# Patient Record
Sex: FEMALE | Employment: OTHER
[De-identification: names, ages, dates, MRNs, and addresses within clinical notes are randomized per-mention and may not be internally consistent; named-entity substitution may affect disease eponyms.]

---

## 2023-02-28 ENCOUNTER — HOSPITAL ENCOUNTER (OUTPATIENT)
Facility: HOSPITAL | Age: 68
Setting detail: SPECIMEN
Discharge: HOME | End: 2023-03-03
Payer: MEDICARE

## 2023-02-28 ENCOUNTER — OFFICE VISIT (OUTPATIENT)
Facility: CLINIC | Age: 68
End: 2023-02-28
Payer: MEDICARE

## 2023-02-28 VITALS
RESPIRATION RATE: 16 BRPM | OXYGEN SATURATION: 96 % | SYSTOLIC BLOOD PRESSURE: 136 MMHG | WEIGHT: 197.5 LBS | TEMPERATURE: 98.3 F | BODY MASS INDEX: 33.72 KG/M2 | HEART RATE: 98 BPM | DIASTOLIC BLOOD PRESSURE: 78 MMHG | HEIGHT: 64 IN

## 2023-02-28 DIAGNOSIS — F33.1 MODERATE EPISODE OF RECURRENT MAJOR DEPRESSIVE DISORDER (HCC): ICD-10-CM

## 2023-02-28 DIAGNOSIS — E66.09 CLASS 1 OBESITY DUE TO EXCESS CALORIES WITH SERIOUS COMORBIDITY AND BODY MASS INDEX (BMI) OF 33.0 TO 33.9 IN ADULT: ICD-10-CM

## 2023-02-28 DIAGNOSIS — I10 ESSENTIAL (PRIMARY) HYPERTENSION: ICD-10-CM

## 2023-02-28 DIAGNOSIS — E11.9 TYPE 2 DIABETES MELLITUS WITHOUT COMPLICATION, WITHOUT LONG-TERM CURRENT USE OF INSULIN (HCC): Primary | ICD-10-CM

## 2023-02-28 DIAGNOSIS — E78.2 MIXED HYPERLIPIDEMIA: ICD-10-CM

## 2023-02-28 DIAGNOSIS — J30.1 SEASONAL ALLERGIC RHINITIS DUE TO POLLEN: ICD-10-CM

## 2023-02-28 PROBLEM — K63.5 POLYP OF COLON: Status: ACTIVE | Noted: 2018-04-19

## 2023-02-28 PROBLEM — K29.70 GASTRITIS DUE TO HELICOBACTER SPECIES: Status: ACTIVE | Noted: 2023-02-28

## 2023-02-28 PROBLEM — N92.0 POLYMENORRHEA: Status: ACTIVE | Noted: 2023-02-28

## 2023-02-28 PROBLEM — M19.91 PRIMARY LOCALIZED OSTEOARTHRITIS: Status: ACTIVE | Noted: 2023-02-28

## 2023-02-28 PROBLEM — G62.9 PERIPHERAL NEUROPATHY: Status: ACTIVE | Noted: 2023-02-28

## 2023-02-28 PROBLEM — Z90.710 HISTORY OF TOTAL HYSTERECTOMY: Status: ACTIVE | Noted: 2023-02-28

## 2023-02-28 PROBLEM — R25.1 TREMORS OF NERVOUS SYSTEM: Status: ACTIVE | Noted: 2022-12-07

## 2023-02-28 PROBLEM — I83.93 VARICOSE VEINS OF BOTH LOWER EXTREMITIES: Status: ACTIVE | Noted: 2023-02-28

## 2023-02-28 PROBLEM — L71.9 ROSACEA, UNSPECIFIED: Status: ACTIVE | Noted: 2018-07-09

## 2023-02-28 PROBLEM — H40.013 OPEN ANGLE WITH BORDERLINE FINDINGS, LOW RISK, BILATERAL: Status: ACTIVE | Noted: 2022-04-29

## 2023-02-28 PROBLEM — G43.909 MIGRAINE HEADACHE: Status: ACTIVE | Noted: 2023-02-28

## 2023-02-28 PROBLEM — B96.81 GASTRITIS DUE TO HELICOBACTER SPECIES: Status: ACTIVE | Noted: 2023-02-28

## 2023-02-28 PROBLEM — H92.09 OTALGIA: Status: ACTIVE | Noted: 2023-02-28

## 2023-02-28 PROBLEM — R07.9 CHEST PAIN: Status: ACTIVE | Noted: 2023-02-28

## 2023-02-28 PROBLEM — N81.6 HERNIATION OF RECTUM INTO VAGINA: Status: ACTIVE | Noted: 2023-02-28

## 2023-02-28 PROBLEM — J30.9 ALLERGIC RHINITIS: Status: ACTIVE | Noted: 2023-02-28

## 2023-02-28 PROBLEM — R03.0 ELEVATED BLOOD-PRESSURE READING, WITHOUT DIAGNOSIS OF HYPERTENSION: Status: ACTIVE | Noted: 2023-02-28

## 2023-02-28 PROBLEM — F32.A DEPRESSION: Status: ACTIVE | Noted: 2023-02-28

## 2023-02-28 PROBLEM — F41.1 GENERALIZED ANXIETY DISORDER: Status: ACTIVE | Noted: 2023-02-28

## 2023-02-28 PROBLEM — I87.2 PERIPHERAL VENOUS INSUFFICIENCY: Status: ACTIVE | Noted: 2023-02-28

## 2023-02-28 PROBLEM — E66.3 OVERWEIGHT: Status: ACTIVE | Noted: 2023-02-28

## 2023-02-28 PROBLEM — M71.9 BURSITIS: Status: ACTIVE | Noted: 2023-02-28

## 2023-02-28 PROBLEM — G56.01 RIGHT CARPAL TUNNEL SYNDROME: Status: ACTIVE | Noted: 2022-12-07

## 2023-02-28 PROBLEM — D64.9 ANEMIA: Status: ACTIVE | Noted: 2023-02-28

## 2023-02-28 PROBLEM — R12 HEARTBURN: Status: ACTIVE | Noted: 2023-02-28

## 2023-02-28 PROBLEM — M79.606 PAIN OF LOWER EXTREMITY: Status: ACTIVE | Noted: 2023-02-28

## 2023-02-28 PROBLEM — H52.4 PRESBYOPIA: Status: ACTIVE | Noted: 2023-02-28

## 2023-02-28 PROBLEM — G45.9 TRANSIENT ISCHEMIC ATTACK (TIA): Status: ACTIVE | Noted: 2023-02-28

## 2023-02-28 PROBLEM — G47.33 OBSTRUCTIVE SLEEP APNEA SYNDROME: Status: ACTIVE | Noted: 2023-02-28

## 2023-02-28 PROBLEM — H69.80 EUSTACHIAN TUBE DYSFUNCTION: Status: ACTIVE | Noted: 2023-02-28

## 2023-02-28 PROBLEM — K64.8 HEMORRHOIDS WITH COMPLICATION: Status: ACTIVE | Noted: 2023-02-28

## 2023-02-28 PROBLEM — E11.40 TYPE 2 DIABETES MELLITUS WITH DIABETIC NEUROPATHY (HCC): Status: ACTIVE | Noted: 2022-08-29

## 2023-02-28 PROBLEM — E55.9 VITAMIN D DEFICIENCY: Status: ACTIVE | Noted: 2022-12-07

## 2023-02-28 PROBLEM — M23.305 DERANGEMENT OF MEDIAL MENISCUS: Status: ACTIVE | Noted: 2023-02-28

## 2023-02-28 PROBLEM — H52.31 ANISOMETROPIA: Status: ACTIVE | Noted: 2023-02-28

## 2023-02-28 PROBLEM — H69.90 EUSTACHIAN TUBE DYSFUNCTION: Status: ACTIVE | Noted: 2023-02-28

## 2023-02-28 PROBLEM — N95.8 POSTARTIFICIAL MENOPAUSAL SYNDROME: Status: ACTIVE | Noted: 2023-02-28

## 2023-02-28 PROBLEM — H11.32 SUBCONJUNCTIVAL HEMORRHAGE OF LEFT EYE: Status: ACTIVE | Noted: 2023-02-28

## 2023-02-28 PROBLEM — M25.569 ARTHRALGIA OF KNEE: Status: ACTIVE | Noted: 2023-02-28

## 2023-02-28 PROBLEM — E66.9 OBESITY: Status: ACTIVE | Noted: 2023-02-28

## 2023-02-28 PROBLEM — N92.1 MENOMETRORRHAGIA: Status: ACTIVE | Noted: 2023-02-28

## 2023-02-28 PROBLEM — R73.03 PREDIABETES: Status: ACTIVE | Noted: 2018-03-13

## 2023-02-28 PROBLEM — N85.01 SIMPLE ENDOMETRIAL HYPERPLASIA: Status: ACTIVE | Noted: 2023-02-28

## 2023-02-28 PROBLEM — M17.9 OSTEOARTHRITIS OF KNEE: Status: ACTIVE | Noted: 2018-07-23

## 2023-02-28 PROBLEM — E78.49 FAMILIAL COMBINED HYPERLIPIDEMIA: Status: ACTIVE | Noted: 2023-02-28

## 2023-02-28 PROBLEM — F90.9 ATTENTION-DEFICIT HYPERACTIVITY DISORDER: Status: ACTIVE | Noted: 2023-02-28

## 2023-02-28 PROBLEM — H25.13 NUCLEAR SCLEROTIC CATARACT OF BOTH EYES: Status: ACTIVE | Noted: 2022-04-29

## 2023-02-28 PROBLEM — E78.5 HYPERLIPIDEMIA: Status: ACTIVE | Noted: 2023-02-28

## 2023-02-28 PROBLEM — R53.83 OTHER FATIGUE: Status: ACTIVE | Noted: 2018-03-13

## 2023-02-28 LAB
ALBUMIN SERPL-MCNC: 4.2 G/DL (ref 3.4–5)
ALBUMIN/GLOB SERPL: 1.4 (ref 0.8–1.7)
ALP SERPL-CCNC: 76 U/L (ref 45–117)
ALT SERPL-CCNC: 56 U/L (ref 13–56)
ANION GAP SERPL CALC-SCNC: 5 MMOL/L (ref 3–18)
AST SERPL-CCNC: 37 U/L (ref 10–38)
BILIRUB SERPL-MCNC: 0.6 MG/DL (ref 0.2–1)
BUN SERPL-MCNC: 12 MG/DL (ref 7–18)
BUN/CREAT SERPL: 14 (ref 12–20)
CALCIUM SERPL-MCNC: 9.7 MG/DL (ref 8.5–10.1)
CHLORIDE SERPL-SCNC: 107 MMOL/L (ref 100–111)
CHOLEST SERPL-MCNC: 190 MG/DL
CO2 SERPL-SCNC: 28 MMOL/L (ref 21–32)
CREAT SERPL-MCNC: 0.86 MG/DL (ref 0.6–1.3)
CREAT UR-MCNC: 162 MG/DL (ref 30–125)
ERYTHROCYTE [DISTWIDTH] IN BLOOD BY AUTOMATED COUNT: 13.8 % (ref 11.6–14.5)
EST. AVERAGE GLUCOSE BLD GHB EST-MCNC: 134 MG/DL
GLOBULIN SER CALC-MCNC: 3.1 G/DL (ref 2–4)
GLUCOSE SERPL-MCNC: 118 MG/DL (ref 74–99)
HBA1C MFR BLD: 6.3 % (ref 4.2–5.6)
HCT VFR BLD AUTO: 41 % (ref 35–45)
HDLC SERPL-MCNC: 50 MG/DL (ref 40–60)
HDLC SERPL: 3.8 (ref 0–5)
HGB BLD-MCNC: 12.9 G/DL (ref 12–16)
LDLC SERPL CALC-MCNC: 96.2 MG/DL (ref 0–100)
LIPID PANEL: ABNORMAL
MCH RBC QN AUTO: 28.5 PG (ref 24–34)
MCHC RBC AUTO-ENTMCNC: 31.5 G/DL (ref 31–37)
MCV RBC AUTO: 90.5 FL (ref 78–100)
MICROALBUMIN UR-MCNC: 2.14 MG/DL (ref 0–3)
MICROALBUMIN/CREAT UR-RTO: 13 MG/G (ref 0–30)
NRBC # BLD: 0 K/UL (ref 0–0.01)
NRBC BLD-RTO: 0 PER 100 WBC
PLATELET # BLD AUTO: 274 K/UL (ref 135–420)
PMV BLD AUTO: 10.5 FL (ref 9.2–11.8)
POTASSIUM SERPL-SCNC: 4.8 MMOL/L (ref 3.5–5.5)
PROT SERPL-MCNC: 7.3 G/DL (ref 6.4–8.2)
RBC # BLD AUTO: 4.53 M/UL (ref 4.2–5.3)
SODIUM SERPL-SCNC: 140 MMOL/L (ref 136–145)
TRIGL SERPL-MCNC: 219 MG/DL
VLDLC SERPL CALC-MCNC: 43.8 MG/DL
WBC # BLD AUTO: 7.4 K/UL (ref 4.6–13.2)

## 2023-02-28 PROCEDURE — 80061 LIPID PANEL: CPT

## 2023-02-28 PROCEDURE — G8400 PT W/DXA NO RESULTS DOC: HCPCS | Performed by: STUDENT IN AN ORGANIZED HEALTH CARE EDUCATION/TRAINING PROGRAM

## 2023-02-28 PROCEDURE — 83036 HEMOGLOBIN GLYCOSYLATED A1C: CPT

## 2023-02-28 PROCEDURE — 1090F PRES/ABSN URINE INCON ASSESS: CPT | Performed by: STUDENT IN AN ORGANIZED HEALTH CARE EDUCATION/TRAINING PROGRAM

## 2023-02-28 PROCEDURE — 1123F ACP DISCUSS/DSCN MKR DOCD: CPT | Performed by: STUDENT IN AN ORGANIZED HEALTH CARE EDUCATION/TRAINING PROGRAM

## 2023-02-28 PROCEDURE — 3017F COLORECTAL CA SCREEN DOC REV: CPT | Performed by: STUDENT IN AN ORGANIZED HEALTH CARE EDUCATION/TRAINING PROGRAM

## 2023-02-28 PROCEDURE — G8417 CALC BMI ABV UP PARAM F/U: HCPCS | Performed by: STUDENT IN AN ORGANIZED HEALTH CARE EDUCATION/TRAINING PROGRAM

## 2023-02-28 PROCEDURE — 3078F DIAST BP <80 MM HG: CPT | Performed by: STUDENT IN AN ORGANIZED HEALTH CARE EDUCATION/TRAINING PROGRAM

## 2023-02-28 PROCEDURE — 82043 UR ALBUMIN QUANTITATIVE: CPT

## 2023-02-28 PROCEDURE — 3046F HEMOGLOBIN A1C LEVEL >9.0%: CPT | Performed by: STUDENT IN AN ORGANIZED HEALTH CARE EDUCATION/TRAINING PROGRAM

## 2023-02-28 PROCEDURE — 80053 COMPREHEN METABOLIC PANEL: CPT

## 2023-02-28 PROCEDURE — 3075F SYST BP GE 130 - 139MM HG: CPT | Performed by: STUDENT IN AN ORGANIZED HEALTH CARE EDUCATION/TRAINING PROGRAM

## 2023-02-28 PROCEDURE — 2022F DILAT RTA XM EVC RTNOPTHY: CPT | Performed by: STUDENT IN AN ORGANIZED HEALTH CARE EDUCATION/TRAINING PROGRAM

## 2023-02-28 PROCEDURE — 85027 COMPLETE CBC AUTOMATED: CPT

## 2023-02-28 PROCEDURE — G8484 FLU IMMUNIZE NO ADMIN: HCPCS | Performed by: STUDENT IN AN ORGANIZED HEALTH CARE EDUCATION/TRAINING PROGRAM

## 2023-02-28 PROCEDURE — 1036F TOBACCO NON-USER: CPT | Performed by: STUDENT IN AN ORGANIZED HEALTH CARE EDUCATION/TRAINING PROGRAM

## 2023-02-28 PROCEDURE — 99204 OFFICE O/P NEW MOD 45 MIN: CPT | Performed by: STUDENT IN AN ORGANIZED HEALTH CARE EDUCATION/TRAINING PROGRAM

## 2023-02-28 PROCEDURE — G8427 DOCREV CUR MEDS BY ELIG CLIN: HCPCS | Performed by: STUDENT IN AN ORGANIZED HEALTH CARE EDUCATION/TRAINING PROGRAM

## 2023-02-28 RX ORDER — NALOXONE HYDROCHLORIDE 4 MG/.1ML
SPRAY NASAL
COMMUNITY
Start: 2021-09-22

## 2023-02-28 RX ORDER — PANTOPRAZOLE SODIUM 20 MG/1
TABLET, DELAYED RELEASE ORAL
COMMUNITY
Start: 2021-09-12

## 2023-02-28 RX ORDER — ROSUVASTATIN CALCIUM 10 MG/1
1 TABLET, COATED ORAL DAILY
COMMUNITY
Start: 2021-12-20 | End: 2023-02-28 | Stop reason: SDUPTHER

## 2023-02-28 RX ORDER — MONTELUKAST SODIUM 10 MG/1
10 TABLET ORAL
COMMUNITY
Start: 2021-05-28 | End: 2023-02-28 | Stop reason: SINTOL

## 2023-02-28 RX ORDER — ROSUVASTATIN CALCIUM 10 MG/1
10 TABLET, COATED ORAL DAILY
Qty: 90 TABLET | Refills: 1 | Status: SHIPPED | OUTPATIENT
Start: 2023-02-28

## 2023-02-28 RX ORDER — SENNA PLUS 8.6 MG/1
8.6 TABLET ORAL
COMMUNITY
Start: 2022-04-18 | End: 2023-04-18

## 2023-02-28 RX ORDER — ASPIRIN 81 MG/1
TABLET ORAL
COMMUNITY
Start: 2021-09-12 | End: 2023-02-28

## 2023-02-28 RX ORDER — LISINOPRIL 20 MG/1
20 TABLET ORAL DAILY
Qty: 90 TABLET | Refills: 1 | Status: SHIPPED | OUTPATIENT
Start: 2023-02-28

## 2023-02-28 RX ORDER — DOCUSATE CALCIUM 240 MG
CAPSULE ORAL
COMMUNITY
Start: 2021-09-12

## 2023-02-28 RX ORDER — LISDEXAMFETAMINE DIMESYLATE 60 MG/1
CAPSULE ORAL
COMMUNITY
Start: 2023-02-09

## 2023-02-28 RX ORDER — AMLODIPINE BESYLATE 10 MG/1
1 TABLET ORAL DAILY
COMMUNITY
Start: 2022-02-01 | End: 2023-02-28 | Stop reason: ALTCHOICE

## 2023-02-28 RX ORDER — ALPRAZOLAM 0.5 MG/1
TABLET ORAL
COMMUNITY
Start: 2021-08-31

## 2023-02-28 RX ORDER — BUPROPION HYDROCHLORIDE 300 MG/1
TABLET ORAL
COMMUNITY
Start: 2023-02-12

## 2023-02-28 SDOH — ECONOMIC STABILITY: FOOD INSECURITY: WITHIN THE PAST 12 MONTHS, YOU WORRIED THAT YOUR FOOD WOULD RUN OUT BEFORE YOU GOT MONEY TO BUY MORE.: NEVER TRUE

## 2023-02-28 SDOH — ECONOMIC STABILITY: FOOD INSECURITY: WITHIN THE PAST 12 MONTHS, THE FOOD YOU BOUGHT JUST DIDN'T LAST AND YOU DIDN'T HAVE MONEY TO GET MORE.: NEVER TRUE

## 2023-02-28 SDOH — ECONOMIC STABILITY: INCOME INSECURITY: HOW HARD IS IT FOR YOU TO PAY FOR THE VERY BASICS LIKE FOOD, HOUSING, MEDICAL CARE, AND HEATING?: NOT HARD AT ALL

## 2023-02-28 SDOH — ECONOMIC STABILITY: HOUSING INSECURITY
IN THE LAST 12 MONTHS, WAS THERE A TIME WHEN YOU DID NOT HAVE A STEADY PLACE TO SLEEP OR SLEPT IN A SHELTER (INCLUDING NOW)?: NO

## 2023-02-28 ASSESSMENT — PATIENT HEALTH QUESTIONNAIRE - PHQ9
3. TROUBLE FALLING OR STAYING ASLEEP: 2
SUM OF ALL RESPONSES TO PHQ QUESTIONS 1-9: 13
2. FEELING DOWN, DEPRESSED OR HOPELESS: 2
10. IF YOU CHECKED OFF ANY PROBLEMS, HOW DIFFICULT HAVE THESE PROBLEMS MADE IT FOR YOU TO DO YOUR WORK, TAKE CARE OF THINGS AT HOME, OR GET ALONG WITH OTHER PEOPLE: 1
8. MOVING OR SPEAKING SO SLOWLY THAT OTHER PEOPLE COULD HAVE NOTICED. OR THE OPPOSITE, BEING SO FIGETY OR RESTLESS THAT YOU HAVE BEEN MOVING AROUND A LOT MORE THAN USUAL: 1
SUM OF ALL RESPONSES TO PHQ9 QUESTIONS 1 & 2: 4
7. TROUBLE CONCENTRATING ON THINGS, SUCH AS READING THE NEWSPAPER OR WATCHING TELEVISION: 1
9. THOUGHTS THAT YOU WOULD BE BETTER OFF DEAD, OR OF HURTING YOURSELF: 0
SUM OF ALL RESPONSES TO PHQ QUESTIONS 1-9: 13
5. POOR APPETITE OR OVEREATING: 2
SUM OF ALL RESPONSES TO PHQ QUESTIONS 1-9: 13
1. LITTLE INTEREST OR PLEASURE IN DOING THINGS: 2
SUM OF ALL RESPONSES TO PHQ QUESTIONS 1-9: 13
4. FEELING TIRED OR HAVING LITTLE ENERGY: 2
6. FEELING BAD ABOUT YOURSELF - OR THAT YOU ARE A FAILURE OR HAVE LET YOURSELF OR YOUR FAMILY DOWN: 1

## 2023-02-28 NOTE — PROGRESS NOTES
Chief Complaint   Patient presents with    Establish Care     Blood sugar     1. \"Have you been to the ER, urgent care clinic since your last visit? Hospitalized since your last visit? \" No    2. \"Have you seen or consulted any other health care providers outside of the [address] [place] [address] since your last visit? \" No     3. For patients aged 39-70: Has the patient had a colonoscopy / FIT/ Cologuard? Yes - no Care Gap present      If the patient is female:    4. For patients aged 41-77: Has the patient had a mammogram within the past 2 years? Yes - no Care Gap present      5. For patients aged 21-65: Has the patient had a pap smear?  NA - based on age or sex

## 2023-02-28 NOTE — PROGRESS NOTES
Junior Larios (: 1955) is a 74 y.o. female, new patient, here for evaluation of the following chief complaint(s):  Establish Care (Blood sugar)       Assessment/Plan:  1. Type 2 diabetes mellitus without complication, without long-term current use of insulin (HCC)-discussed diabetic diet and increasing exercise. Recheck A1c today. -     Hemoglobin A1C; Future  -     Microalbumin / Creatinine Urine Ratio; Future  2. Moderate episode of recurrent major depressive disorder (HCC)-currently seeing a PA for medication management. Strongly recommend finding a therapist as many of her comorbid illnesses I believe are related to her persistent depression. Including diet changes and lack of exercise. 3. Class 1 obesity due to excess calories with serious comorbidity and body mass index (BMI) of 33.0 to 33.9 in adult-diet and lifestyle modifications for weight loss. Dietary principles from MyPlate.gov for a healthy, varied diet. Reduce caloric intake. Recommend cumulatively 150-minutes of moderate intensity exercise or 75-minutes of vigorous activity weekly. 4. Mixed hyperlipidemia continue Crestor for the time being. Recheck lipid panel. Recommend STOP aspirin as this was for primary prevention and trying to reduce polypharmacy. -     Lipid Panel; Future  -     rosuvastatin (CRESTOR) 10 MG tablet; Take 1 tablet by mouth daily, Disp-90 tablet, R-1Normal  5. Essential (primary) hypertension-previously on amlodipine 10 mg.  Given new diagnosis of diabetes 6 months ago, will STOP amlodipine, switch to an ACE inhibitor  -     Comprehensive Metabolic Panel; Future  -     CBC; Future  -     lisinopril (PRINIVIL;ZESTRIL) 20 MG tablet; Take 1 tablet by mouth daily, Disp-90 tablet, R-1Normal  6. Seasonal allergic rhinitis due to pollen-Claritin as needed as well as albuterol. Recommend STOP Singulair given its black box warning of neuropsych events in the patient's underlying psychiatric illness.     Return in about 4 weeks (around 3/28/2023) for [address] 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 Daughter is a fan medicine doctor in the [employer] currently stationed in [facility]. Unfortunately patient had significant family trauma of her son taking his own life 5 years ago. Depression-currently seeing a telehealth PA for her psychiatric medications. Not currently with therapy. Discussed some decreased motivation and interest in doing things she used to. Waiting on some self-medication including sugar and as needed cigarettes. Diabetes-upcoming eye appointment next month. Slowly been creeping up A1c over the last 10 years. Last A1c 6 months ago was 6.5. Never on any antidiabetic medications. Prefers to keep it this way. Had good weight loss and felt better went on a vegan diet and is going to try this again. Allergies-has been on Singulair and Claritin. Vitals  Blood pressure 136/78, pulse 98, temperature 98.3 °F (36.8 °C), temperature source Tympanic, resp. rate 16, height 5' 4\" (1.626 m), weight 197 lb 8 oz (89.6 kg), SpO2 96 %. Body mass index is 33.9 kg/m². General appearance - Alert, NAD. Head - Atraumatic. Normocephalic. No lymphadenopathy  Eyes - EOMI. Sclera white. Ears - Hearing grossly normal.    Nose - Nares patent, no polyps  Throat - pharynx clear, no exudates. Respiratory - LCTAB. No wheeze/rale/rhonchi  Heart - Normal rate, regular rhythm. No m/r/r  Abdomen - Soft, non tender. Non distended. Neurological - No focal deficits. Speech normal.   Musculoskeletal - Normal ROM, Gait normal.    Extremities - No LE edema. Skin - normal coloration and normal turgor. No cyanosis, no rash. Medical History- Reviewed Social History- Reviewed Surgical History- Reviewed   Ranjit Jarrett has no past medical history on file. Ranjit Jarrett reports that she quit smoking about 34 years ago. Her smoking use included cigarettes.  She has a 7.50 pack-year smoking history. She has never used smokeless tobacco. She reports that she does not drink alcohol and does not use drugs. Elisa Cabezas has a past surgical history that includes knee surgery (Bilateral) and Hysterectomy, total abdominal.         Problem List- Reviewed   Elisa Cabezas has Allergic rhinitis; Anemia; Chest pain; Attention-deficit hyperactivity disorder; Bursitis; Osteoarthritis of knee; Depression; Derangement of medial meniscus; Elevated blood-pressure reading, without diagnosis of hypertension; Essential (primary) hypertension; Eustachian tube dysfunction; Familial combined hyperlipidemia; Gastritis due to Helicobacter species; Generalized anxiety disorder; Heartburn; Hemorrhoids with complication; Herniation of rectum into vagina; History of total hysterectomy; Hyperlipidemia; Anisometropia; Menometrorrhagia; Migraine headache; Nuclear sclerotic cataract of both eyes; Obesity; Obstructive sleep apnea syndrome; Open angle with borderline findings, low risk, bilateral; Otalgia; Other fatigue; Overweight; Pain of lower extremity; Peripheral neuropathy; Peripheral venous insufficiency; Polymenorrhea; Polyp of colon; Postartificial menopausal syndrome; Prediabetes; Presbyopia; Arthralgia of knee; Primary localized osteoarthritis; Right carpal tunnel syndrome; Rosacea, unspecified; Simple endometrial hyperplasia; Subconjunctival hemorrhage of left eye; Transient ischemic attack (TIA); Tremors of nervous system; Type 2 diabetes mellitus with diabetic neuropathy ([address] Type 2 diabetes mellitus without complications ([address] Varicose veins of both lower extremities; and Vitamin D deficiency on their problem list.       Current Outpatient Medications   Medication Instructions    ALPRAZolam (XANAX) 0.5 MG tablet No dose, route, or frequency recorded.     buPROPion (WELLBUTRIN XL) 300 MG extended release tablet take 1 tablet by mouth every morning    docusate calcium (SURFAK) 240 MG capsule docusate calcium 240 mg oral capsule  Start Date: 9/12/21  Status: Ordered    lisinopril (PRINIVIL;ZESTRIL) 20 mg, Oral, DAILY    naloxone 4 MG/0.1ML LIQD nasal spray naloxone 4 mg/0.1 mL nasal spray  Start Date: 9/22/21  Status: Ordered    pantoprazole (PROTONIX) 20 MG tablet pantoprazole 20 mg oral delayed release tablet  Start Date: 9/12/21  Status: Ordered    rosuvastatin (CRESTOR) 10 mg, Oral, DAILY    senna (SENOKOT) 8.6 mg, Oral    vitamin D (CHOLECALCIFEROL) 2,000 Units, Oral, DAILY    VORTIoxetine HBr (TRINTELLIX) 20 MG TABS tablet No dose, route, or frequency recorded. VYVANSE 60 MG CAPS take 1 capsule by mouth every morning           An electronic signature was used to authenticate this note.   -- Jesús Felder MD

## 2023-03-24 ENCOUNTER — TELEPHONE (OUTPATIENT)
Facility: CLINIC | Age: 68
End: 2023-03-24

## 2023-03-24 NOTE — TELEPHONE ENCOUNTER
----- Message from Merrill Snowden MD sent at 3/22/2023 11:39 AM EDT -----  Overall your blood work looks great!  -A1c of 6.3. Continue diabetic diet as we discussed. No new medications needed at this time. -Mild elevation of triglycerides.  -Your electrolytes, kidney function, liver function, blood cell counts, and cholesterol were all within good ranges.

## 2023-03-29 ENCOUNTER — OFFICE VISIT (OUTPATIENT)
Facility: CLINIC | Age: 68
End: 2023-03-29

## 2023-03-29 VITALS
WEIGHT: 197 LBS | HEART RATE: 78 BPM | BODY MASS INDEX: 33.81 KG/M2 | RESPIRATION RATE: 16 BRPM | OXYGEN SATURATION: 98 % | TEMPERATURE: 97.9 F | SYSTOLIC BLOOD PRESSURE: 138 MMHG | DIASTOLIC BLOOD PRESSURE: 74 MMHG

## 2023-03-29 DIAGNOSIS — Z09 HOSPITAL DISCHARGE FOLLOW-UP: ICD-10-CM

## 2023-03-29 DIAGNOSIS — E78.2 MIXED HYPERLIPIDEMIA: ICD-10-CM

## 2023-03-29 DIAGNOSIS — I10 ESSENTIAL (PRIMARY) HYPERTENSION: ICD-10-CM

## 2023-03-29 DIAGNOSIS — J30.1 SEASONAL ALLERGIC RHINITIS DUE TO POLLEN: ICD-10-CM

## 2023-03-29 DIAGNOSIS — G45.9 TIA (TRANSIENT ISCHEMIC ATTACK): Primary | ICD-10-CM

## 2023-03-29 DIAGNOSIS — F90.9 ATTENTION DEFICIT HYPERACTIVITY DISORDER (ADHD), UNSPECIFIED ADHD TYPE: ICD-10-CM

## 2023-03-29 DIAGNOSIS — E11.40 TYPE 2 DIABETES MELLITUS WITH DIABETIC NEUROPATHY, WITHOUT LONG-TERM CURRENT USE OF INSULIN (HCC): ICD-10-CM

## 2023-03-29 RX ORDER — CLOPIDOGREL BISULFATE 75 MG/1
75 TABLET ORAL DAILY
COMMUNITY
Start: 2023-03-29 | End: 2023-04-19

## 2023-03-29 RX ORDER — OMEPRAZOLE 20 MG/1
20 CAPSULE, DELAYED RELEASE ORAL DAILY
COMMUNITY

## 2023-03-29 RX ORDER — ROSUVASTATIN CALCIUM 20 MG/1
20 TABLET, COATED ORAL NIGHTLY
Qty: 90 TABLET | Refills: 3 | Status: SHIPPED | OUTPATIENT
Start: 2023-03-29

## 2023-03-29 RX ORDER — LISINOPRIL 40 MG/1
40 TABLET ORAL DAILY
Qty: 90 TABLET | Refills: 1 | Status: SHIPPED | OUTPATIENT
Start: 2023-03-29

## 2023-03-29 RX ORDER — MONTELUKAST SODIUM 10 MG/1
10 TABLET ORAL NIGHTLY
Qty: 90 TABLET | Refills: 1 | Status: SHIPPED | OUTPATIENT
Start: 2023-03-29

## 2023-03-29 RX ORDER — FLUTICASONE PROPIONATE 50 MCG
1 SPRAY, SUSPENSION (ML) NASAL DAILY
COMMUNITY

## 2023-03-29 NOTE — PROGRESS NOTES
Chief Complaint   Patient presents with    Follow-up   1. \"Have you been to the ER, urgent care clinic since your last visit? Hospitalized since your last visit? \" Yes 3/27    2. \"Have you seen or consulted any other health care providers outside of the [address] [place] [address] since your last visit? \" No     3. For patients aged 39-70: Has the patient had a colonoscopy / FIT/ Cologuard? Yes - no Care Gap present      If the patient is female:    4. For patients aged 41-77: Has the patient had a mammogram within the past 2 years? Yes - no Care Gap present      5. For patients aged 21-65: Has the patient had a pap smear?  NA - based on age or sex
like didn't have the energy to speak. Not necessarily dysarhtria. When EMS found her she had a /100      Vitals  Blood pressure 138/74, pulse 78, temperature 97.9 °F (36.6 °C), temperature source Tympanic, resp. rate 16, weight 197 lb (89.4 kg), SpO2 98 %. Body mass index is 33.81 kg/m². General appearance - Alert, NAD, normal appearance  Head - Atraumatic. Normocephalic. Eyes - EOMI. Sclera white. Ears - Hearing grossly normal.    Respiratory - LCTAB. Effort normal, without respiratory distress. No wheeze/rale/rhonchi  Heart - Normal rate, regular rhythm. No m/r/r  Neurological - No gross focal deficits. Speech normal. Alert and oriented. Mental status is at baseline. Musculoskeletal - Grossly normal ROM, Gait normal.    Skin - normal coloration and normal turgor. Medical History- Reviewed Social History- Reviewed Surgical History- Reviewed   Edward Ruby has no past medical history on file. Edward Ruby reports that she quit smoking about 34 years ago. Her smoking use included cigarettes. She has a 7.50 pack-year smoking history. She has never used smokeless tobacco. She reports that she does not drink alcohol and does not use drugs. Edward Ruby has a past surgical history that includes knee surgery (Bilateral) and Hysterectomy, total abdominal.         Problem List- Reviewed   Edward Ruby has Allergic rhinitis; Anemia; Chest pain; Attention-deficit hyperactivity disorder; Bursitis; Osteoarthritis of knee; Depression; Derangement of medial meniscus; Elevated blood-pressure reading, without diagnosis of hypertension; Essential (primary) hypertension; Eustachian tube dysfunction; Familial combined hyperlipidemia; Gastritis due to Helicobacter species; Generalized anxiety disorder; Heartburn; Hemorrhoids with complication; Herniation of rectum into vagina; History of total hysterectomy; Hyperlipidemia; Anisometropia; Menometrorrhagia; Migraine headache; Nuclear sclerotic cataract of both eyes; Obesity;  Obstructive sleep

## 2023-05-03 ENCOUNTER — OFFICE VISIT (OUTPATIENT)
Facility: CLINIC | Age: 68
End: 2023-05-03
Payer: MEDICARE

## 2023-05-03 VITALS
BODY MASS INDEX: 33.12 KG/M2 | OXYGEN SATURATION: 98 % | DIASTOLIC BLOOD PRESSURE: 86 MMHG | WEIGHT: 194 LBS | TEMPERATURE: 98.4 F | SYSTOLIC BLOOD PRESSURE: 122 MMHG | HEIGHT: 64 IN | HEART RATE: 94 BPM | RESPIRATION RATE: 16 BRPM

## 2023-05-03 DIAGNOSIS — Z53.20 OSTEOPOROSIS SCREENING DECLINED: ICD-10-CM

## 2023-05-03 DIAGNOSIS — E11.40 TYPE 2 DIABETES MELLITUS WITH DIABETIC NEUROPATHY, WITHOUT LONG-TERM CURRENT USE OF INSULIN (HCC): ICD-10-CM

## 2023-05-03 DIAGNOSIS — Z00.00 WELCOME TO MEDICARE PREVENTIVE VISIT: Primary | ICD-10-CM

## 2023-05-03 DIAGNOSIS — F33.1 MODERATE EPISODE OF RECURRENT MAJOR DEPRESSIVE DISORDER (HCC): ICD-10-CM

## 2023-05-03 PROCEDURE — 1123F ACP DISCUSS/DSCN MKR DOCD: CPT | Performed by: STUDENT IN AN ORGANIZED HEALTH CARE EDUCATION/TRAINING PROGRAM

## 2023-05-03 PROCEDURE — 3017F COLORECTAL CA SCREEN DOC REV: CPT | Performed by: STUDENT IN AN ORGANIZED HEALTH CARE EDUCATION/TRAINING PROGRAM

## 2023-05-03 PROCEDURE — G0438 PPPS, INITIAL VISIT: HCPCS | Performed by: STUDENT IN AN ORGANIZED HEALTH CARE EDUCATION/TRAINING PROGRAM

## 2023-05-03 PROCEDURE — 3044F HG A1C LEVEL LT 7.0%: CPT | Performed by: STUDENT IN AN ORGANIZED HEALTH CARE EDUCATION/TRAINING PROGRAM

## 2023-05-03 PROCEDURE — 3078F DIAST BP <80 MM HG: CPT | Performed by: STUDENT IN AN ORGANIZED HEALTH CARE EDUCATION/TRAINING PROGRAM

## 2023-05-03 PROCEDURE — 3074F SYST BP LT 130 MM HG: CPT | Performed by: STUDENT IN AN ORGANIZED HEALTH CARE EDUCATION/TRAINING PROGRAM

## 2023-05-03 ASSESSMENT — PATIENT HEALTH QUESTIONNAIRE - PHQ9
10. IF YOU CHECKED OFF ANY PROBLEMS, HOW DIFFICULT HAVE THESE PROBLEMS MADE IT FOR YOU TO DO YOUR WORK, TAKE CARE OF THINGS AT HOME, OR GET ALONG WITH OTHER PEOPLE: 1
5. POOR APPETITE OR OVEREATING: 0
1. LITTLE INTEREST OR PLEASURE IN DOING THINGS: 3
2. FEELING DOWN, DEPRESSED OR HOPELESS: 3
4. FEELING TIRED OR HAVING LITTLE ENERGY: 0
8. MOVING OR SPEAKING SO SLOWLY THAT OTHER PEOPLE COULD HAVE NOTICED. OR THE OPPOSITE, BEING SO FIGETY OR RESTLESS THAT YOU HAVE BEEN MOVING AROUND A LOT MORE THAN USUAL: 0
3. TROUBLE FALLING OR STAYING ASLEEP: 3
SUM OF ALL RESPONSES TO PHQ9 QUESTIONS 1 & 2: 6
9. THOUGHTS THAT YOU WOULD BE BETTER OFF DEAD, OR OF HURTING YOURSELF: 0
7. TROUBLE CONCENTRATING ON THINGS, SUCH AS READING THE NEWSPAPER OR WATCHING TELEVISION: 1
6. FEELING BAD ABOUT YOURSELF - OR THAT YOU ARE A FAILURE OR HAVE LET YOURSELF OR YOUR FAMILY DOWN: 0
SUM OF ALL RESPONSES TO PHQ QUESTIONS 1-9: 10

## 2023-05-03 ASSESSMENT — LIFESTYLE VARIABLES
HOW OFTEN DO YOU HAVE A DRINK CONTAINING ALCOHOL: NEVER
HOW MANY STANDARD DRINKS CONTAINING ALCOHOL DO YOU HAVE ON A TYPICAL DAY: PATIENT DOES NOT DRINK

## 2023-05-03 NOTE — PROGRESS NOTES
Chief Complaint   Patient presents with    Medicare AWV     1. \"Have you been to the ER, urgent care clinic since your last visit? Hospitalized since your last visit? \" No    2. \"Have you seen or consulted any other health care providers outside of the [address] [place] [address] since your last visit? \" Yes [place] [facility]      3. For patients aged 39-70: Has the patient had a colonoscopy / FIT/ Cologuard? NA - based on age      If the patient is female:    4. For patients aged 41-77: Has the patient had a mammogram within the past 2 years? Yes - no Care Gap present      5. For patients aged 21-65: Has the patient had a pap smear?  Yes - no Care Gap present

## 2023-05-03 NOTE — PROGRESS NOTES
Medicare Annual Wellness Visit    Tosin Dill is here for Medicare AWV    Assessment & Plan   Welcome to Medicare preventive visit  Moderate episode of recurrent major depressive disorder ([facility]  -     External Referral To Psychiatry  Osteoporosis screening declined  Type 2 diabetes mellitus with diabetic neuropathy, without long-term current use of insulin ([facility]  -      DIABETES EYE EXAM    Recommendations for Preventive Services Due: see orders and patient instructions/AVS.  Recommended screening schedule for the next 5-10 years is provided to the patient in written form: see Patient Instructions/AVS.     Return in about 6 months (around 11/3/2023) for routine check up. Subjective       - Currently seeing a telehealth NP, [facility]. Would like to see an MD for possible med change. Recently more down and tired. - Mammo a yr ago. No history of abnml mammo.  - Colonoscopy about 4yrs ago, due in 2024. - Eye doctor 4/2023Aimee Shutters. Patient's complete Health Risk Assessment and screening values have been reviewed and are found in Flowsheets. The following problems were reviewed today and where indicated follow up appointments were made and/or referrals ordered. Positive Risk Factor Screenings with Interventions:        Depression:  PHQ-2 Score: 6  PHQ-9 Total Score: 10    Interpretation:   1-4 = minimal  5-9 = mild  10-14 = moderate  15-19 = moderately severe  20-27 = severe  Interventions:  Changing psych provider to Dr. Levar Claudio as requested. Weight and Activity:  Physical Activity: Inactive    Days of Exercise per Week: 0 days    Minutes of Exercise per Session: 0 min     On average, how many days per week do you engage in moderate to strenuous exercise (like a brisk walk)?: 0 days  Have you lost any weight without trying in the past 3 months?: No  Body mass index is 33.3 kg/m². (!) Abnormal    Inactivity Interventions:  Just joined [employer]  Looking to work out

## 2023-07-19 ENCOUNTER — TELEMEDICINE (OUTPATIENT)
Facility: CLINIC | Age: 68
End: 2023-07-19
Payer: MEDICARE

## 2023-07-19 DIAGNOSIS — F41.1 GENERALIZED ANXIETY DISORDER: ICD-10-CM

## 2023-07-19 DIAGNOSIS — I10 ESSENTIAL (PRIMARY) HYPERTENSION: Primary | ICD-10-CM

## 2023-07-19 DIAGNOSIS — F90.9 ATTENTION DEFICIT HYPERACTIVITY DISORDER (ADHD), UNSPECIFIED ADHD TYPE: ICD-10-CM

## 2023-07-19 PROCEDURE — G8427 DOCREV CUR MEDS BY ELIG CLIN: HCPCS | Performed by: STUDENT IN AN ORGANIZED HEALTH CARE EDUCATION/TRAINING PROGRAM

## 2023-07-19 PROCEDURE — G8400 PT W/DXA NO RESULTS DOC: HCPCS | Performed by: STUDENT IN AN ORGANIZED HEALTH CARE EDUCATION/TRAINING PROGRAM

## 2023-07-19 PROCEDURE — 1090F PRES/ABSN URINE INCON ASSESS: CPT | Performed by: STUDENT IN AN ORGANIZED HEALTH CARE EDUCATION/TRAINING PROGRAM

## 2023-07-19 PROCEDURE — 99214 OFFICE O/P EST MOD 30 MIN: CPT | Performed by: STUDENT IN AN ORGANIZED HEALTH CARE EDUCATION/TRAINING PROGRAM

## 2023-07-19 PROCEDURE — 3017F COLORECTAL CA SCREEN DOC REV: CPT | Performed by: STUDENT IN AN ORGANIZED HEALTH CARE EDUCATION/TRAINING PROGRAM

## 2023-07-19 PROCEDURE — 1123F ACP DISCUSS/DSCN MKR DOCD: CPT | Performed by: STUDENT IN AN ORGANIZED HEALTH CARE EDUCATION/TRAINING PROGRAM

## 2023-07-19 RX ORDER — LISINOPRIL 10 MG/1
10 TABLET ORAL DAILY
Qty: 90 TABLET | Refills: 3 | Status: SHIPPED | OUTPATIENT
Start: 2023-07-19

## 2023-07-19 RX ORDER — DEXTROMETHORPHAN HYDROBROMIDE, BUPROPION HYDROCHLORIDE 105; 45 MG/1; MG/1
1 TABLET, MULTILAYER, EXTENDED RELEASE ORAL 2 TIMES DAILY
COMMUNITY
Start: 2023-06-04

## 2023-07-19 NOTE — PROGRESS NOTES
Chief Complaint   Patient presents with    Follow-up       1. \"Have you been to the ER, urgent care clinic since your last visit? Hospitalized since your last visit? \" Yes dizziness. 2. \"Have you seen or consulted any other health care providers outside of the [address] [place] [address] since your last visit? \" No     3. For patients aged 43-73: Has the patient had a colonoscopy / FIT/ Cologuard?  Yes - no Care Gap present

## 2023-07-19 NOTE — PROGRESS NOTES
Gage Franks (: 1955) is a 74 y.o. female, Established patient patient, here for evaluation of the following chief complaint(s):   Follow-up       Assessment/Plan:  1. Essential (primary) hypertension-side effects of treatment on higher doses of lisinopril. Now endorses normotension on lisinopril self titrated down to 10 mg. On ACE inhibitor given diabetes. Continue lisinopril 10 mg daily.  -Discussed the risks of elevated blood pressure through Vyvanse and her new medicine Auvilty (through the dextromethorphan component). We will need to keep an eye on this a couple times a week to trend and make sure that it still stays normotensive.  -     lisinopril (PRINIVIL;ZESTRIL) 10 MG tablet; Take 1 tablet by mouth daily, Disp-90 tablet, R-3Normal  2. Attention deficit hyperactivity disorder (ADHD), unspecified ADHD type-from a blood pressure and cardiovascular standpoint, would still recommend avoidance of Vyvanse as able. Patient to try and take less frequently. 3. Generalized anxiety disorder-as above      Return in about 3 months (around 10/19/2023) for routine check up. Subjective/Objective:  HPI    HTN- Previously elevated Lisinopril from 20mg to 40mg but there was concern about hypotension.   - Lately has been 100-120s/70-80s at home. -Recent 2lb weight loss. Trying to improve on diet and exercise. Therapist- Just started 4 weeks ago a new medicine, Auvility. Just taking Vyvanse, somewhat more as needed. Still on Trintellix. Physical Exam:  No flowsheet data found.    Constitutional: [x] Appears well-developed and well-nourished [x] No apparent distress      [] Abnormal -     Mental status: [x] Alert and awake  [x] Oriented to person/place/time [x] Able to follow commands    [] Abnormal -     Eyes:   EOM    [x]  Normal    [] Abnormal -   Sclera  [x]  Normal    [] Abnormal -          Discharge [x]  None visible   [] Abnormal -     HENT: [x] Normocephalic, atraumatic  []

## 2023-08-15 DIAGNOSIS — I10 ESSENTIAL (PRIMARY) HYPERTENSION: ICD-10-CM

## 2023-08-22 RX ORDER — LISINOPRIL 20 MG/1
TABLET ORAL
Qty: 90 TABLET | Refills: 1 | OUTPATIENT
Start: 2023-08-22

## 2023-09-14 DIAGNOSIS — J30.1 SEASONAL ALLERGIC RHINITIS DUE TO POLLEN: ICD-10-CM

## 2023-10-09 RX ORDER — MONTELUKAST SODIUM 10 MG/1
10 TABLET ORAL NIGHTLY
Qty: 90 TABLET | Refills: 1 | OUTPATIENT
Start: 2023-10-09

## 2024-02-20 LAB — HBA1C MFR BLD HPLC: 6.6 %

## 2024-03-18 DIAGNOSIS — G45.9 TIA (TRANSIENT ISCHEMIC ATTACK): ICD-10-CM

## 2024-03-18 DIAGNOSIS — E78.2 MIXED HYPERLIPIDEMIA: ICD-10-CM

## 2024-03-18 RX ORDER — ROSUVASTATIN CALCIUM 20 MG/1
20 TABLET, COATED ORAL NIGHTLY
Qty: 90 TABLET | Refills: 3 | OUTPATIENT
Start: 2024-03-18